# Patient Record
Sex: FEMALE | Race: WHITE | HISPANIC OR LATINO
[De-identification: names, ages, dates, MRNs, and addresses within clinical notes are randomized per-mention and may not be internally consistent; named-entity substitution may affect disease eponyms.]

---

## 2018-06-06 ENCOUNTER — APPOINTMENT (OUTPATIENT)
Dept: ANTEPARTUM | Facility: CLINIC | Age: 28
End: 2018-06-06
Payer: MEDICAID

## 2018-06-06 ENCOUNTER — APPOINTMENT (OUTPATIENT)
Dept: MATERNAL FETAL MEDICINE | Facility: CLINIC | Age: 28
End: 2018-06-06
Payer: MEDICAID

## 2018-06-06 ENCOUNTER — ASOB RESULT (OUTPATIENT)
Age: 28
End: 2018-06-06

## 2018-06-06 VITALS
RESPIRATION RATE: 18 BRPM | WEIGHT: 199 LBS | HEIGHT: 64 IN | SYSTOLIC BLOOD PRESSURE: 98 MMHG | BODY MASS INDEX: 33.97 KG/M2 | DIASTOLIC BLOOD PRESSURE: 56 MMHG | HEART RATE: 90 BPM | OXYGEN SATURATION: 98 %

## 2018-06-06 DIAGNOSIS — O99.210 OBESITY COMPLICATING PREGNANCY, UNSPECIFIED TRIMESTER: ICD-10-CM

## 2018-06-06 DIAGNOSIS — Z98.891 HISTORY OF UTERINE SCAR FROM PREVIOUS SURGERY: ICD-10-CM

## 2018-06-06 DIAGNOSIS — Z87.59 PERSONAL HISTORY OF OTHER COMPLICATIONS OF PREGNANCY, CHILDBIRTH AND THE PUERPERIUM: ICD-10-CM

## 2018-06-06 PROCEDURE — 36416 COLLJ CAPILLARY BLOOD SPEC: CPT

## 2018-06-06 PROCEDURE — 76813 OB US NUCHAL MEAS 1 GEST: CPT

## 2018-06-06 PROCEDURE — 99213 OFFICE O/P EST LOW 20 MIN: CPT

## 2018-06-06 RX ORDER — VITAMIN C, CALCIUM, IRON, VITAMIN D3, VITAMIN E, VITAMIN B1, VITAMIN B2, VITAMIN B3, VITAMIN B6, FOLIC ACID, IODINE, ZINC, COPPER, DOCUSATE SODIUM, DOCOSAHEXAENOIC ACID (DHA) 27-1-50 MG
KIT ORAL
Refills: 0 | Status: ACTIVE | COMMUNITY

## 2018-06-12 LAB
1ST TRIMESTER DATA: NORMAL
ADDENDUM DOC: NORMAL
AFP PNL SERPL: NORMAL
AFP SERPL-ACNC: NORMAL
CLINICAL BIOCHEMIST REVIEW: NORMAL
FREE BETA HCG 1ST TRIMESTER: NORMAL
Lab: NORMAL
NASAL BONE: PRESENT
NOTES NTD: NORMAL
NT: NORMAL
PAPP-A SERPL-ACNC: NORMAL
TRISOMY 18/3: NORMAL

## 2018-06-22 ENCOUNTER — ASOB RESULT (OUTPATIENT)
Age: 28
End: 2018-06-22

## 2018-06-22 ENCOUNTER — APPOINTMENT (OUTPATIENT)
Dept: MATERNAL FETAL MEDICINE | Facility: CLINIC | Age: 28
End: 2018-06-22
Payer: MEDICAID

## 2018-06-22 PROCEDURE — 76815 OB US LIMITED FETUS(S): CPT

## 2018-08-02 ENCOUNTER — APPOINTMENT (OUTPATIENT)
Dept: ANTEPARTUM | Facility: CLINIC | Age: 28
End: 2018-08-02
Payer: MEDICAID

## 2018-08-02 ENCOUNTER — ASOB RESULT (OUTPATIENT)
Age: 28
End: 2018-08-02

## 2018-08-02 PROCEDURE — 76811 OB US DETAILED SNGL FETUS: CPT

## 2018-08-02 PROCEDURE — 76817 TRANSVAGINAL US OBSTETRIC: CPT

## 2018-08-03 ENCOUNTER — APPOINTMENT (OUTPATIENT)
Dept: ANTEPARTUM | Facility: CLINIC | Age: 28
End: 2018-08-03

## 2018-08-16 ENCOUNTER — APPOINTMENT (OUTPATIENT)
Dept: ANTEPARTUM | Facility: CLINIC | Age: 28
End: 2018-08-16
Payer: MEDICAID

## 2018-08-16 ENCOUNTER — ASOB RESULT (OUTPATIENT)
Age: 28
End: 2018-08-16

## 2018-08-16 PROCEDURE — 76816 OB US FOLLOW-UP PER FETUS: CPT

## 2018-10-08 ENCOUNTER — APPOINTMENT (OUTPATIENT)
Dept: ANTEPARTUM | Facility: CLINIC | Age: 28
End: 2018-10-08
Payer: MEDICAID

## 2018-10-08 ENCOUNTER — ASOB RESULT (OUTPATIENT)
Age: 28
End: 2018-10-08

## 2018-10-08 PROCEDURE — 76816 OB US FOLLOW-UP PER FETUS: CPT

## 2018-10-08 PROCEDURE — 76819 FETAL BIOPHYS PROFIL W/O NST: CPT

## 2018-11-19 ENCOUNTER — ASOB RESULT (OUTPATIENT)
Age: 28
End: 2018-11-19

## 2018-11-19 ENCOUNTER — APPOINTMENT (OUTPATIENT)
Dept: ANTEPARTUM | Facility: CLINIC | Age: 28
End: 2018-11-19
Payer: MEDICAID

## 2018-11-19 PROCEDURE — 76819 FETAL BIOPHYS PROFIL W/O NST: CPT

## 2018-11-19 PROCEDURE — 76816 OB US FOLLOW-UP PER FETUS: CPT

## 2018-11-27 ENCOUNTER — OUTPATIENT (OUTPATIENT)
Dept: OUTPATIENT SERVICES | Facility: HOSPITAL | Age: 28
LOS: 1 days | End: 2018-11-27
Payer: COMMERCIAL

## 2018-11-27 VITALS
HEART RATE: 97 BPM | RESPIRATION RATE: 16 BRPM | DIASTOLIC BLOOD PRESSURE: 70 MMHG | TEMPERATURE: 98 F | SYSTOLIC BLOOD PRESSURE: 118 MMHG

## 2018-11-27 VITALS — HEIGHT: 65.75 IN | WEIGHT: 191.8 LBS

## 2018-11-27 DIAGNOSIS — Z01.818 ENCOUNTER FOR OTHER PREPROCEDURAL EXAMINATION: ICD-10-CM

## 2018-11-27 DIAGNOSIS — Z98.89 OTHER SPECIFIED POSTPROCEDURAL STATES: Chronic | ICD-10-CM

## 2018-11-27 LAB
APPEARANCE UR: CLEAR — SIGNIFICANT CHANGE UP
BACTERIA # UR AUTO: ABNORMAL
BASOPHILS # BLD AUTO: 0 K/UL — SIGNIFICANT CHANGE UP (ref 0–0.2)
BASOPHILS NFR BLD AUTO: 0.1 % — SIGNIFICANT CHANGE UP (ref 0–2)
BILIRUB UR-MCNC: NEGATIVE — SIGNIFICANT CHANGE UP
BLD GP AB SCN SERPL QL: SIGNIFICANT CHANGE UP
COLOR SPEC: YELLOW — SIGNIFICANT CHANGE UP
DIFF PNL FLD: ABNORMAL
EOSINOPHIL # BLD AUTO: 0.1 K/UL — SIGNIFICANT CHANGE UP (ref 0–0.5)
EOSINOPHIL NFR BLD AUTO: 1.1 % — SIGNIFICANT CHANGE UP (ref 0–6)
EPI CELLS # UR: ABNORMAL
GLUCOSE UR QL: NEGATIVE MG/DL — SIGNIFICANT CHANGE UP
HCT VFR BLD CALC: 36.1 % — LOW (ref 37–47)
HGB BLD-MCNC: 12 G/DL — SIGNIFICANT CHANGE UP (ref 12–16)
KETONES UR-MCNC: NEGATIVE — SIGNIFICANT CHANGE UP
LEUKOCYTE ESTERASE UR-ACNC: ABNORMAL
LYMPHOCYTES # BLD AUTO: 1.6 K/UL — SIGNIFICANT CHANGE UP (ref 1–4.8)
LYMPHOCYTES # BLD AUTO: 17.9 % — LOW (ref 20–55)
MCHC RBC-ENTMCNC: 28.6 PG — SIGNIFICANT CHANGE UP (ref 27–31)
MCHC RBC-ENTMCNC: 33.2 G/DL — SIGNIFICANT CHANGE UP (ref 32–36)
MCV RBC AUTO: 86.2 FL — SIGNIFICANT CHANGE UP (ref 81–99)
MONOCYTES # BLD AUTO: 0.5 K/UL — SIGNIFICANT CHANGE UP (ref 0–0.8)
MONOCYTES NFR BLD AUTO: 5.8 % — SIGNIFICANT CHANGE UP (ref 3–10)
NEUTROPHILS # BLD AUTO: 6.6 K/UL — SIGNIFICANT CHANGE UP (ref 1.8–8)
NEUTROPHILS NFR BLD AUTO: 74.6 % — HIGH (ref 37–73)
NITRITE UR-MCNC: NEGATIVE — SIGNIFICANT CHANGE UP
PH UR: 7 — SIGNIFICANT CHANGE UP (ref 5–8)
PLATELET # BLD AUTO: 176 K/UL — SIGNIFICANT CHANGE UP (ref 150–400)
PROT UR-MCNC: 15 MG/DL
RBC # BLD: 4.19 M/UL — LOW (ref 4.4–5.2)
RBC # FLD: 14.9 % — SIGNIFICANT CHANGE UP (ref 11–15.6)
RBC CASTS # UR COMP ASSIST: SIGNIFICANT CHANGE UP /HPF (ref 0–4)
SP GR SPEC: 1.01 — SIGNIFICANT CHANGE UP (ref 1.01–1.02)
TYPE + AB SCN PNL BLD: SIGNIFICANT CHANGE UP
UROBILINOGEN FLD QL: NEGATIVE MG/DL — SIGNIFICANT CHANGE UP
WBC # BLD: 8.8 K/UL — SIGNIFICANT CHANGE UP (ref 4.8–10.8)
WBC # FLD AUTO: 8.8 K/UL — SIGNIFICANT CHANGE UP (ref 4.8–10.8)
WBC UR QL: SIGNIFICANT CHANGE UP

## 2018-11-27 PROCEDURE — 36415 COLL VENOUS BLD VENIPUNCTURE: CPT

## 2018-11-27 PROCEDURE — 86901 BLOOD TYPING SEROLOGIC RH(D): CPT

## 2018-11-27 PROCEDURE — 81001 URINALYSIS AUTO W/SCOPE: CPT

## 2018-11-27 PROCEDURE — 86780 TREPONEMA PALLIDUM: CPT

## 2018-11-27 PROCEDURE — 85027 COMPLETE CBC AUTOMATED: CPT

## 2018-11-27 PROCEDURE — 86900 BLOOD TYPING SEROLOGIC ABO: CPT

## 2018-11-27 PROCEDURE — 86850 RBC ANTIBODY SCREEN: CPT

## 2018-11-28 LAB — T PALLIDUM AB TITR SER: NEGATIVE — SIGNIFICANT CHANGE UP

## 2018-12-07 ENCOUNTER — TRANSCRIPTION ENCOUNTER (OUTPATIENT)
Age: 28
End: 2018-12-07

## 2018-12-08 ENCOUNTER — RESULT REVIEW (OUTPATIENT)
Age: 28
End: 2018-12-08

## 2018-12-08 ENCOUNTER — INPATIENT (INPATIENT)
Facility: HOSPITAL | Age: 28
LOS: 2 days | Discharge: HOME | End: 2018-12-11
Attending: OBSTETRICS & GYNECOLOGY | Admitting: OBSTETRICS & GYNECOLOGY
Payer: COMMERCIAL

## 2018-12-08 VITALS
TEMPERATURE: 99 F | DIASTOLIC BLOOD PRESSURE: 68 MMHG | HEART RATE: 95 BPM | RESPIRATION RATE: 18 BRPM | SYSTOLIC BLOOD PRESSURE: 103 MMHG | OXYGEN SATURATION: 97 %

## 2018-12-08 DIAGNOSIS — Z98.89 OTHER SPECIFIED POSTPROCEDURAL STATES: Chronic | ICD-10-CM

## 2018-12-08 DIAGNOSIS — K35.80 UNSPECIFIED ACUTE APPENDICITIS: Chronic | ICD-10-CM

## 2018-12-08 DIAGNOSIS — O34.219 MATERNAL CARE FOR UNSPECIFIED TYPE SCAR FROM PREVIOUS CESAREAN DELIVERY: ICD-10-CM

## 2018-12-08 LAB
BLD GP AB SCN SERPL QL: SIGNIFICANT CHANGE UP
TYPE + AB SCN PNL BLD: SIGNIFICANT CHANGE UP

## 2018-12-08 PROCEDURE — 88302 TISSUE EXAM BY PATHOLOGIST: CPT | Mod: 26

## 2018-12-08 PROCEDURE — 88304 TISSUE EXAM BY PATHOLOGIST: CPT | Mod: 26

## 2018-12-08 RX ORDER — DIPHENHYDRAMINE HCL 50 MG
25 CAPSULE ORAL EVERY 6 HOURS
Qty: 0 | Refills: 0 | Status: DISCONTINUED | OUTPATIENT
Start: 2018-12-08 | End: 2018-12-11

## 2018-12-08 RX ORDER — ONDANSETRON 8 MG/1
4 TABLET, FILM COATED ORAL EVERY 6 HOURS
Qty: 0 | Refills: 0 | Status: DISCONTINUED | OUTPATIENT
Start: 2018-12-08 | End: 2018-12-11

## 2018-12-08 RX ORDER — FERROUS SULFATE 325(65) MG
325 TABLET ORAL DAILY
Qty: 0 | Refills: 0 | Status: DISCONTINUED | OUTPATIENT
Start: 2018-12-08 | End: 2018-12-11

## 2018-12-08 RX ORDER — TETANUS TOXOID, REDUCED DIPHTHERIA TOXOID AND ACELLULAR PERTUSSIS VACCINE, ADSORBED 5; 2.5; 8; 8; 2.5 [IU]/.5ML; [IU]/.5ML; UG/.5ML; UG/.5ML; UG/.5ML
0.5 SUSPENSION INTRAMUSCULAR ONCE
Qty: 0 | Refills: 0 | Status: DISCONTINUED | OUTPATIENT
Start: 2018-12-08 | End: 2018-12-11

## 2018-12-08 RX ORDER — SIMETHICONE 80 MG/1
80 TABLET, CHEWABLE ORAL EVERY 4 HOURS
Qty: 0 | Refills: 0 | Status: DISCONTINUED | OUTPATIENT
Start: 2018-12-08 | End: 2018-12-11

## 2018-12-08 RX ORDER — CEFAZOLIN SODIUM 1 G
2000 VIAL (EA) INJECTION ONCE
Qty: 0 | Refills: 0 | Status: COMPLETED | OUTPATIENT
Start: 2018-12-08 | End: 2018-12-08

## 2018-12-08 RX ORDER — IBUPROFEN 200 MG
600 TABLET ORAL EVERY 6 HOURS
Qty: 0 | Refills: 0 | Status: DISCONTINUED | OUTPATIENT
Start: 2018-12-08 | End: 2018-12-11

## 2018-12-08 RX ORDER — DIPHENHYDRAMINE HCL 50 MG
25 CAPSULE ORAL EVERY 4 HOURS
Qty: 0 | Refills: 0 | Status: DISCONTINUED | OUTPATIENT
Start: 2018-12-08 | End: 2018-12-11

## 2018-12-08 RX ORDER — GLYCERIN ADULT
1 SUPPOSITORY, RECTAL RECTAL AT BEDTIME
Qty: 0 | Refills: 0 | Status: DISCONTINUED | OUTPATIENT
Start: 2018-12-08 | End: 2018-12-11

## 2018-12-08 RX ORDER — OXYTOCIN 10 UNIT/ML
41.67 VIAL (ML) INJECTION
Qty: 20 | Refills: 0 | Status: DISCONTINUED | OUTPATIENT
Start: 2018-12-08 | End: 2018-12-11

## 2018-12-08 RX ORDER — OXYCODONE HYDROCHLORIDE 5 MG/1
10 TABLET ORAL
Qty: 0 | Refills: 0 | Status: DISCONTINUED | OUTPATIENT
Start: 2018-12-08 | End: 2018-12-11

## 2018-12-08 RX ORDER — OXYCODONE HYDROCHLORIDE 5 MG/1
5 TABLET ORAL
Qty: 0 | Refills: 0 | Status: DISCONTINUED | OUTPATIENT
Start: 2018-12-08 | End: 2018-12-11

## 2018-12-08 RX ORDER — SODIUM CHLORIDE 9 MG/ML
1000 INJECTION, SOLUTION INTRAVENOUS ONCE
Qty: 0 | Refills: 0 | Status: COMPLETED | OUTPATIENT
Start: 2018-12-08 | End: 2018-12-08

## 2018-12-08 RX ORDER — OXYTOCIN 10 UNIT/ML
333.33 VIAL (ML) INJECTION
Qty: 20 | Refills: 0 | Status: DISCONTINUED | OUTPATIENT
Start: 2018-12-08 | End: 2018-12-11

## 2018-12-08 RX ORDER — ACETAMINOPHEN 500 MG
1000 TABLET ORAL ONCE
Qty: 0 | Refills: 0 | Status: COMPLETED | OUTPATIENT
Start: 2018-12-08 | End: 2018-12-08

## 2018-12-08 RX ORDER — ENOXAPARIN SODIUM 100 MG/ML
40 INJECTION SUBCUTANEOUS
Qty: 0 | Refills: 0 | Status: DISCONTINUED | OUTPATIENT
Start: 2018-12-08 | End: 2018-12-11

## 2018-12-08 RX ORDER — ACETAMINOPHEN 500 MG
1000 TABLET ORAL ONCE
Qty: 0 | Refills: 0 | Status: DISCONTINUED | OUTPATIENT
Start: 2018-12-08 | End: 2018-12-11

## 2018-12-08 RX ORDER — DOCUSATE SODIUM 100 MG
100 CAPSULE ORAL
Qty: 0 | Refills: 0 | Status: DISCONTINUED | OUTPATIENT
Start: 2018-12-08 | End: 2018-12-11

## 2018-12-08 RX ORDER — HYDROMORPHONE HYDROCHLORIDE 2 MG/ML
1 INJECTION INTRAMUSCULAR; INTRAVENOUS; SUBCUTANEOUS
Qty: 0 | Refills: 0 | Status: DISCONTINUED | OUTPATIENT
Start: 2018-12-08 | End: 2018-12-11

## 2018-12-08 RX ORDER — OXYCODONE AND ACETAMINOPHEN 5; 325 MG/1; MG/1
2 TABLET ORAL EVERY 6 HOURS
Qty: 0 | Refills: 0 | Status: DISCONTINUED | OUTPATIENT
Start: 2018-12-08 | End: 2018-12-11

## 2018-12-08 RX ORDER — SODIUM CHLORIDE 9 MG/ML
1000 INJECTION, SOLUTION INTRAVENOUS
Qty: 0 | Refills: 0 | Status: DISCONTINUED | OUTPATIENT
Start: 2018-12-08 | End: 2018-12-11

## 2018-12-08 RX ORDER — KETOROLAC TROMETHAMINE 30 MG/ML
30 SYRINGE (ML) INJECTION ONCE
Qty: 0 | Refills: 0 | Status: DISCONTINUED | OUTPATIENT
Start: 2018-12-08 | End: 2018-12-08

## 2018-12-08 RX ORDER — OXYCODONE AND ACETAMINOPHEN 5; 325 MG/1; MG/1
1 TABLET ORAL
Qty: 0 | Refills: 0 | Status: DISCONTINUED | OUTPATIENT
Start: 2018-12-08 | End: 2018-12-11

## 2018-12-08 RX ORDER — KETOROLAC TROMETHAMINE 30 MG/ML
30 SYRINGE (ML) INJECTION EVERY 6 HOURS
Qty: 0 | Refills: 0 | Status: DISCONTINUED | OUTPATIENT
Start: 2018-12-08 | End: 2018-12-09

## 2018-12-08 RX ORDER — LANOLIN
1 OINTMENT (GRAM) TOPICAL
Qty: 0 | Refills: 0 | Status: DISCONTINUED | OUTPATIENT
Start: 2018-12-08 | End: 2018-12-11

## 2018-12-08 RX ORDER — OXYTOCIN 10 UNIT/ML
41.67 VIAL (ML) INJECTION
Qty: 20 | Refills: 0 | Status: DISCONTINUED | OUTPATIENT
Start: 2018-12-08 | End: 2018-12-08

## 2018-12-08 RX ORDER — CITRIC ACID/SODIUM CITRATE 300-500 MG
30 SOLUTION, ORAL ORAL ONCE
Qty: 0 | Refills: 0 | Status: COMPLETED | OUTPATIENT
Start: 2018-12-08 | End: 2018-12-08

## 2018-12-08 RX ORDER — NALOXONE HYDROCHLORIDE 4 MG/.1ML
0.1 SPRAY NASAL
Qty: 0 | Refills: 0 | Status: DISCONTINUED | OUTPATIENT
Start: 2018-12-08 | End: 2018-12-11

## 2018-12-08 RX ORDER — METOCLOPRAMIDE HCL 10 MG
10 TABLET ORAL ONCE
Qty: 0 | Refills: 0 | Status: DISCONTINUED | OUTPATIENT
Start: 2018-12-08 | End: 2018-12-11

## 2018-12-08 RX ADMIN — SODIUM CHLORIDE 125 MILLILITER(S): 9 INJECTION, SOLUTION INTRAVENOUS at 06:59

## 2018-12-08 RX ADMIN — Medication 1000 MILLIUNIT(S)/MIN: at 08:26

## 2018-12-08 RX ADMIN — Medication 1000 MILLIGRAM(S): at 11:49

## 2018-12-08 RX ADMIN — SODIUM CHLORIDE 2000 MILLILITER(S): 9 INJECTION, SOLUTION INTRAVENOUS at 06:20

## 2018-12-08 RX ADMIN — Medication 30 MILLILITER(S): at 07:13

## 2018-12-08 RX ADMIN — Medication 30 MILLIGRAM(S): at 20:42

## 2018-12-08 RX ADMIN — ENOXAPARIN SODIUM 40 MILLIGRAM(S): 100 INJECTION SUBCUTANEOUS at 20:27

## 2018-12-08 RX ADMIN — Medication 400 MILLIGRAM(S): at 10:08

## 2018-12-08 RX ADMIN — Medication 30 MILLIGRAM(S): at 14:00

## 2018-12-08 RX ADMIN — Medication 125 MILLIUNIT(S)/MIN: at 09:58

## 2018-12-08 RX ADMIN — Medication 100 MILLIGRAM(S): at 08:00

## 2018-12-08 RX ADMIN — Medication 30 MILLIGRAM(S): at 20:27

## 2018-12-08 RX ADMIN — Medication 30 MILLIGRAM(S): at 14:15

## 2018-12-08 NOTE — OB NEONATOLOGY/PEDIATRICIAN DELIVERY SUMMARY - NSPEDSNEONOTESA_OBGYN_ALL_OB_FT
Requested by Dr. Wagner to attempt this repeat C/S. Mother is O positive, Rubella Imm, GBS negative, RPR neg, HIV neg and HBsAg negative. Infant born on  presentation , good tone and cry. Apgar 9 and 9. Full term male AGA. Requested by Dr. Wagner to attempt this repeat C/S. Mother is O positive, Rubella Imm, GBS negative, RPR neg, HIV neg and HBsAg negative. Infant born on vertex  presentation , good tone and cry. Apgar 9 and 9. Full term male AGA.BW 3830g.

## 2018-12-08 NOTE — OB PROVIDER H&P - NSHPPHYSICALEXAM_GEN_ALL_CORE
Vital Signs Last 24 Hrs  T(C): 36.9 (08 Dec 2018 06:29), Max: 36.9 (08 Dec 2018 06:11)  T(F): 98.42 (08 Dec 2018 06:29), Max: 98.42 (08 Dec 2018 06:29)  HR: 96 (08 Dec 2018 06:29) (96 - 96)  BP: 111/67 (08 Dec 2018 06:29) (111/67 - 111/67)  RR: 16 (08 Dec 2018 06:29) (16 - 16)    FHT: baseline 130, moderate variability, +accels, no decels   [place]: no contractions  SVE: deferred

## 2018-12-08 NOTE — OB PROVIDER H&P - HISTORY OF PRESENT ILLNESS
29 y/o  at 39w2d presenting for scheduled repeat  section.   Prenatal course uncomplicated.     ObHx: C/S x2, SAB x3  GynHx: none  PMH/PSH: appendectomy in   Meds: PNV   Allergies: NKDA

## 2018-12-09 LAB
BASOPHILS # BLD AUTO: 0 K/UL — SIGNIFICANT CHANGE UP (ref 0–0.2)
BASOPHILS NFR BLD AUTO: 0.1 % — SIGNIFICANT CHANGE UP (ref 0–2)
EOSINOPHIL # BLD AUTO: 0.2 K/UL — SIGNIFICANT CHANGE UP (ref 0–0.5)
EOSINOPHIL NFR BLD AUTO: 1.7 % — SIGNIFICANT CHANGE UP (ref 0–6)
HCT VFR BLD CALC: 30.6 % — LOW (ref 37–47)
HGB BLD-MCNC: 9.9 G/DL — LOW (ref 12–16)
LYMPHOCYTES # BLD AUTO: 1.8 K/UL — SIGNIFICANT CHANGE UP (ref 1–4.8)
LYMPHOCYTES # BLD AUTO: 18.9 % — LOW (ref 20–55)
MCHC RBC-ENTMCNC: 27.8 PG — SIGNIFICANT CHANGE UP (ref 27–31)
MCHC RBC-ENTMCNC: 32.4 G/DL — SIGNIFICANT CHANGE UP (ref 32–36)
MCV RBC AUTO: 86 FL — SIGNIFICANT CHANGE UP (ref 81–99)
MONOCYTES # BLD AUTO: 0.5 K/UL — SIGNIFICANT CHANGE UP (ref 0–0.8)
MONOCYTES NFR BLD AUTO: 5.5 % — SIGNIFICANT CHANGE UP (ref 3–10)
NEUTROPHILS # BLD AUTO: 6.8 K/UL — SIGNIFICANT CHANGE UP (ref 1.8–8)
NEUTROPHILS NFR BLD AUTO: 73.6 % — HIGH (ref 37–73)
PLATELET # BLD AUTO: 151 K/UL — SIGNIFICANT CHANGE UP (ref 150–400)
RBC # BLD: 3.56 M/UL — LOW (ref 4.4–5.2)
RBC # FLD: 15 % — SIGNIFICANT CHANGE UP (ref 11–15.6)
T PALLIDUM AB TITR SER: NEGATIVE — SIGNIFICANT CHANGE UP
WBC # BLD: 9.3 K/UL — SIGNIFICANT CHANGE UP (ref 4.8–10.8)
WBC # FLD AUTO: 9.3 K/UL — SIGNIFICANT CHANGE UP (ref 4.8–10.8)

## 2018-12-09 RX ADMIN — Medication 600 MILLIGRAM(S): at 17:43

## 2018-12-09 RX ADMIN — Medication 325 MILLIGRAM(S): at 12:22

## 2018-12-09 RX ADMIN — Medication 600 MILLIGRAM(S): at 18:30

## 2018-12-09 RX ADMIN — ENOXAPARIN SODIUM 40 MILLIGRAM(S): 100 INJECTION SUBCUTANEOUS at 20:09

## 2018-12-09 RX ADMIN — OXYCODONE AND ACETAMINOPHEN 1 TABLET(S): 5; 325 TABLET ORAL at 21:58

## 2018-12-09 RX ADMIN — SIMETHICONE 80 MILLIGRAM(S): 80 TABLET, CHEWABLE ORAL at 08:21

## 2018-12-09 RX ADMIN — Medication 30 MILLIGRAM(S): at 02:11

## 2018-12-09 RX ADMIN — Medication 600 MILLIGRAM(S): at 08:20

## 2018-12-09 RX ADMIN — OXYCODONE AND ACETAMINOPHEN 1 TABLET(S): 5; 325 TABLET ORAL at 16:39

## 2018-12-09 RX ADMIN — Medication 100 MILLIGRAM(S): at 12:22

## 2018-12-09 RX ADMIN — Medication 600 MILLIGRAM(S): at 09:00

## 2018-12-09 RX ADMIN — OXYCODONE AND ACETAMINOPHEN 1 TABLET(S): 5; 325 TABLET ORAL at 13:00

## 2018-12-09 RX ADMIN — SIMETHICONE 80 MILLIGRAM(S): 80 TABLET, CHEWABLE ORAL at 12:22

## 2018-12-09 RX ADMIN — OXYCODONE AND ACETAMINOPHEN 1 TABLET(S): 5; 325 TABLET ORAL at 12:22

## 2018-12-09 RX ADMIN — OXYCODONE AND ACETAMINOPHEN 1 TABLET(S): 5; 325 TABLET ORAL at 23:09

## 2018-12-09 RX ADMIN — OXYCODONE AND ACETAMINOPHEN 1 TABLET(S): 5; 325 TABLET ORAL at 17:37

## 2018-12-09 RX ADMIN — Medication 600 MILLIGRAM(S): at 23:55

## 2018-12-09 RX ADMIN — Medication 1 TABLET(S): at 12:22

## 2018-12-09 RX ADMIN — Medication 30 MILLIGRAM(S): at 02:26

## 2018-12-09 NOTE — PROGRESS NOTE ADULT - SUBJECTIVE AND OBJECTIVE BOX
INTERVAL HPI/OVERNIGHT EVENTS:  28y Female s/p repeat c section and B/L tubal ligation under spinal anesthesia with duramorph for post op analgesia on 10/8/18    Vital Signs Last 24 Hrs  T(C): 36.7 (09 Dec 2018 08:12), Max: 36.7 (08 Dec 2018 16:06)  T(F): 98.1 (09 Dec 2018 08:12), Max: 98.1 (09 Dec 2018 08:12)  HR: 102 (09 Dec 2018 08:12) (77 - 102)  BP: 106/56 (09 Dec 2018 08:12) (104/63 - 124/75)  BP(mean): --  RR: 18 (09 Dec 2018 08:12) (16 - 21)  SpO2: 97% (09 Dec 2018 04:13) (97% - 97%)    Patient seen, doing well, no anesthetic complications or complaints noted or reported.  Pain is controlled.

## 2018-12-09 NOTE — PROGRESS NOTE ADULT - ASSESSMENT
Section Day: 1  She feels well  Continue the current pain medication  Encourage Ambulation  Encourage regular diet   DVT ppx: SCDs  She is stable, tolerates a diet and has normal flatus but no BM yet  She will be discharged on Day 2,3 or 4   according to the normal criteria and her wishes

## 2018-12-09 NOTE — PROGRESS NOTE ADULT - SUBJECTIVE AND OBJECTIVE BOX
Postpartum Note,  Section  She is a  28y woman who is now post-operative day 1    Subjective:  The patient feels well.  She has ambulated and is tolerating a diet  She is having  flatus, but no BM yet  She reports no breathing problems  She reports no headache or visual changes  She reports normal postpartum bleeding    Physical exam:  She generally looks and feels well    Vital Signs Last 24 Hrs  T(C): 36.7 (09 Dec 2018 08:12), Max: 36.7 (08 Dec 2018 16:06)  T(F): 98.1 (09 Dec 2018 08:12), Max: 98.1 (09 Dec 2018 08:12)  HR: 102 (09 Dec 2018 08:12) (79 - 102)  BP: 106/56 (09 Dec 2018 08:12) (106/56 - 119/79)  BP(mean): --  RR: 18 (09 Dec 2018 08:12) (17 - 21)  SpO2: 97% (09 Dec 2018 04:13) (97% - 97%)    Lungs: Normal  Heart: Regular rate and rhythm  Abdomen: Soft, nontender, no distension , firm uterine fundus, the incision is clean dry and intact  Pelvic: Normal lochia noted  Ext: No DVT signs, warm extremities, normal pulses    LABS:                        9.9    9.3   )-----------( 151      ( 09 Dec 2018 07:01 )             30.6                 Allergies    No Known Allergies    Intolerances      MEDICATIONS  (STANDING):  acetaminophen  IVPB .. 1000 milliGRAM(s) IV Intermittent once  diphtheria/tetanus/pertussis (acellular) Vaccine (ADAcel) 0.5 milliLiter(s) IntraMuscular once  enoxaparin Injectable 40 milliGRAM(s) SubCutaneous <User Schedule>  ferrous    sulfate 325 milliGRAM(s) Oral daily  lactated ringers. 1000 milliLiter(s) (125 mL/Hr) IV Continuous <Continuous>  lactated ringers. 1000 milliLiter(s) (125 mL/Hr) IV Continuous <Continuous>  lactated ringers. 1000 milliLiter(s) (125 mL/Hr) IV Continuous <Continuous>  oxytocin Infusion 333.333 milliUNIT(s)/Min (1000 mL/Hr) IV Continuous <Continuous>  oxytocin Infusion 41.667 milliUNIT(s)/Min (125 mL/Hr) IV Continuous <Continuous>  prenatal multivitamin 1 Tablet(s) Oral daily    MEDICATIONS  (PRN):  diphenhydrAMINE 25 milliGRAM(s) Oral every 6 hours PRN Itching  diphenhydrAMINE   Injectable 25 milliGRAM(s) IV Push every 4 hours PRN Pruritus  docusate sodium 100 milliGRAM(s) Oral two times a day PRN Stool Softening  glycerin Suppository - Adult 1 Suppository(s) Rectal at bedtime PRN Constipation  HYDROmorphone  Injectable 1 milliGRAM(s) IV Push every 3 hours PRN Severe Pain (7 - 10)  ibuprofen  Tablet. 600 milliGRAM(s) Oral every 6 hours PRN Mild Pain (1 - 3)  lanolin Ointment 1 Application(s) Topical every 3 hours PRN Sore Nipples  metoclopramide Injectable 10 milliGRAM(s) IV Push once PRN Nausea and/or Vomiting  naloxone Injectable 0.1 milliGRAM(s) IV Push every 3 minutes PRN For ANY of the following changes in patient status:  A. RR LESS THAN 10 breaths per minute, B. Oxygen saturation LESS THAN 90%, C. Sedation score of 6  ondansetron Injectable 4 milliGRAM(s) IV Push every 6 hours PRN Nausea  ondansetron Injectable 4 milliGRAM(s) IV Push every 6 hours PRN Postoperative Nausea and  Vomiting  oxyCODONE    5 mG/acetaminophen 325 mG 1 Tablet(s) Oral every 3 hours PRN Moderate Pain (4 - 6)  oxyCODONE    5 mG/acetaminophen 325 mG 2 Tablet(s) Oral every 6 hours PRN Severe Pain (7 - 10)  oxyCODONE    IR 5 milliGRAM(s) Oral every 3 hours PRN Mild Pain (1 - 3)  oxyCODONE    IR 10 milliGRAM(s) Oral every 3 hours PRN Moderate Pain (4 - 6)  simethicone 80 milliGRAM(s) Chew every 4 hours PRN Gas

## 2018-12-10 RX ADMIN — Medication 600 MILLIGRAM(S): at 11:22

## 2018-12-10 RX ADMIN — Medication 600 MILLIGRAM(S): at 00:52

## 2018-12-10 RX ADMIN — OXYCODONE AND ACETAMINOPHEN 1 TABLET(S): 5; 325 TABLET ORAL at 06:37

## 2018-12-10 RX ADMIN — Medication 325 MILLIGRAM(S): at 11:22

## 2018-12-10 RX ADMIN — Medication 600 MILLIGRAM(S): at 21:41

## 2018-12-10 RX ADMIN — ENOXAPARIN SODIUM 40 MILLIGRAM(S): 100 INJECTION SUBCUTANEOUS at 20:11

## 2018-12-10 RX ADMIN — Medication 600 MILLIGRAM(S): at 20:41

## 2018-12-10 RX ADMIN — OXYCODONE AND ACETAMINOPHEN 2 TABLET(S): 5; 325 TABLET ORAL at 15:12

## 2018-12-10 RX ADMIN — SIMETHICONE 80 MILLIGRAM(S): 80 TABLET, CHEWABLE ORAL at 11:22

## 2018-12-10 RX ADMIN — Medication 600 MILLIGRAM(S): at 11:52

## 2018-12-10 RX ADMIN — Medication 1 TABLET(S): at 11:22

## 2018-12-10 NOTE — PROGRESS NOTE ADULT - SUBJECTIVE AND OBJECTIVE BOX
Postpartum Note,  Section  She is a  28y woman who is now post-operative day 2    Subjective:  The patient feels well.  She is ambulating and tolerating a diet  She is having bowel movements and flatus  She reports no breathing problems  She reports no headache or visual changes  She reports normal postpartum bleeding    Physical exam:  She generally looks and feels well    Vital Signs Last 24 Hrs  T(C): 36.7 (10 Dec 2018 08:26), Max: 36.8 (09 Dec 2018 20:35)  T(F): 98 (10 Dec 2018 08:26), Max: 98.2 (09 Dec 2018 20:35)  HR: 77 (10 Dec 2018 08:26) (77 - 93)  BP: 105/70 (10 Dec 2018 08:26) (105/70 - 115/76)  BP(mean): --  RR: 18 (10 Dec 2018 08:26) (18 - 20)  SpO2: 98% (09 Dec 2018 20:35) (98% - 98%)    Lungs: Normal  Heart: Regular rate and rhythm  Abdomen: Soft, nontender, no distension , firm uterine fundus, the incision is clean dry and intact  Pelvic: Normal lochia noted  Ext: No DVT signs, warm extremities, normal pulses    LABS:                        9.9    9.3   )-----------( 151      ( 09 Dec 2018 07:01 )             30.6                 Allergies    No Known Allergies    Intolerances      MEDICATIONS  (STANDING):  acetaminophen  IVPB .. 1000 milliGRAM(s) IV Intermittent once  diphtheria/tetanus/pertussis (acellular) Vaccine (ADAcel) 0.5 milliLiter(s) IntraMuscular once  enoxaparin Injectable 40 milliGRAM(s) SubCutaneous <User Schedule>  ferrous    sulfate 325 milliGRAM(s) Oral daily  lactated ringers. 1000 milliLiter(s) (125 mL/Hr) IV Continuous <Continuous>  lactated ringers. 1000 milliLiter(s) (125 mL/Hr) IV Continuous <Continuous>  lactated ringers. 1000 milliLiter(s) (125 mL/Hr) IV Continuous <Continuous>  oxytocin Infusion 333.333 milliUNIT(s)/Min (1000 mL/Hr) IV Continuous <Continuous>  oxytocin Infusion 41.667 milliUNIT(s)/Min (125 mL/Hr) IV Continuous <Continuous>  prenatal multivitamin 1 Tablet(s) Oral daily    MEDICATIONS  (PRN):  diphenhydrAMINE 25 milliGRAM(s) Oral every 6 hours PRN Itching  diphenhydrAMINE   Injectable 25 milliGRAM(s) IV Push every 4 hours PRN Pruritus  docusate sodium 100 milliGRAM(s) Oral two times a day PRN Stool Softening  glycerin Suppository - Adult 1 Suppository(s) Rectal at bedtime PRN Constipation  HYDROmorphone  Injectable 1 milliGRAM(s) IV Push every 3 hours PRN Severe Pain (7 - 10)  ibuprofen  Tablet. 600 milliGRAM(s) Oral every 6 hours PRN Mild Pain (1 - 3)  lanolin Ointment 1 Application(s) Topical every 3 hours PRN Sore Nipples  metoclopramide Injectable 10 milliGRAM(s) IV Push once PRN Nausea and/or Vomiting  naloxone Injectable 0.1 milliGRAM(s) IV Push every 3 minutes PRN For ANY of the following changes in patient status:  A. RR LESS THAN 10 breaths per minute, B. Oxygen saturation LESS THAN 90%, C. Sedation score of 6  ondansetron Injectable 4 milliGRAM(s) IV Push every 6 hours PRN Nausea  ondansetron Injectable 4 milliGRAM(s) IV Push every 6 hours PRN Postoperative Nausea and  Vomiting  oxyCODONE    5 mG/acetaminophen 325 mG 1 Tablet(s) Oral every 3 hours PRN Moderate Pain (4 - 6)  oxyCODONE    5 mG/acetaminophen 325 mG 2 Tablet(s) Oral every 6 hours PRN Severe Pain (7 - 10)  oxyCODONE    IR 5 milliGRAM(s) Oral every 3 hours PRN Mild Pain (1 - 3)  oxyCODONE    IR 10 milliGRAM(s) Oral every 3 hours PRN Moderate Pain (4 - 6)  simethicone 80 milliGRAM(s) Chew every 4 hours PRN Gas

## 2018-12-10 NOTE — PROGRESS NOTE ADULT - ASSESSMENT
Section Day: 2  She feels well  Continue the current pain medication  Encourage Ambulation  Encourage regular diet   DVT ppx: SCDs  She is stable, tolerates a diet and has normal flatus and bowel movements  She will be discharged on Day 3 or 4   according to the normal criteria and as per her wishes

## 2018-12-11 ENCOUNTER — TRANSCRIPTION ENCOUNTER (OUTPATIENT)
Age: 28
End: 2018-12-11

## 2018-12-11 VITALS
TEMPERATURE: 98 F | DIASTOLIC BLOOD PRESSURE: 83 MMHG | RESPIRATION RATE: 18 BRPM | HEART RATE: 81 BPM | SYSTOLIC BLOOD PRESSURE: 120 MMHG

## 2018-12-11 RX ORDER — IBUPROFEN 200 MG
1 TABLET ORAL
Qty: 28 | Refills: 1 | OUTPATIENT
Start: 2018-12-11 | End: 2018-12-24

## 2018-12-11 RX ADMIN — Medication 100 MILLIGRAM(S): at 00:15

## 2018-12-11 RX ADMIN — SIMETHICONE 80 MILLIGRAM(S): 80 TABLET, CHEWABLE ORAL at 00:15

## 2018-12-11 RX ADMIN — Medication 600 MILLIGRAM(S): at 07:44

## 2018-12-11 RX ADMIN — Medication 600 MILLIGRAM(S): at 06:34

## 2018-12-11 NOTE — DISCHARGE NOTE OB - CARE PROVIDER_API CALL
Adwoa Olivares), Mina and Sofia [facility] Obstetrics and Gynecology  [address]  [address]  [place] [address]  Phone: [phone]  Fax: [phone]

## 2018-12-11 NOTE — DISCHARGE NOTE OB - BECAUSE OF A PHYSICAL, MENTAL OR EMOTIONAL CONDITION, DO YOU HAVE DIFFICULTY DOING  ERRANDS ALONE LIKE VISITING A DOCTOR'S OFFICE OR SHOPPING (15 YEARS AND OLDER)
Patient medically cleared by MDs for discharge. Vitals stable. IV removed. Tele removed and returned. Pts belongings and Medication 7.5/325 Hydrocodone/ acetamin 1/2 tablet from Pharmacy returned. Report given to the Receiving [address] [place] [address] from Francesca Barbosa.
No

## 2018-12-11 NOTE — PROGRESS NOTE ADULT - SUBJECTIVE AND OBJECTIVE BOX
Postpartum Note,  Section  She is a  28y woman who is now post-operative day 3    Subjective:  The patient feels well.  She is ambulating and tolerating a diet  She is having bowel movements and flatus  She reports no breathing problems  She reports no headache or visual changes  She reports normal postpartum bleeding    Physical exam:  She generally looks and feels well    Vital Signs Last 24 Hrs  T(C): 37 (10 Dec 2018 19:11), Max: 37 (10 Dec 2018 19:11)  T(F): 98.6 (10 Dec 2018 19:11), Max: 98.6 (10 Dec 2018 19:11)  HR: 87 (10 Dec 2018 19:11) (77 - 87)  BP: 103/74 (10 Dec 2018 19:11) (103/74 - 105/70)  BP(mean): --  RR: 18 (10 Dec 2018 19:11) (18 - 18)  SpO2: --    Lungs: Normal  Heart: Regular rate and rhythm  Abdomen: Soft, nontender, no distension , firm uterine fundus, the incision is clean dry and intact  Pelvic: Normal lochia noted  Ext: No DVT signs, warm extremities, normal pulses    LABS:                        9.9    9.3   )-----------( 151      ( 09 Dec 2018 07:01 )             30.6                 Allergies    No Known Allergies    Intolerances      MEDICATIONS  (STANDING):  acetaminophen  IVPB .. 1000 milliGRAM(s) IV Intermittent once  diphtheria/tetanus/pertussis (acellular) Vaccine (ADAcel) 0.5 milliLiter(s) IntraMuscular once  enoxaparin Injectable 40 milliGRAM(s) SubCutaneous <User Schedule>  ferrous    sulfate 325 milliGRAM(s) Oral daily  lactated ringers. 1000 milliLiter(s) (125 mL/Hr) IV Continuous <Continuous>  lactated ringers. 1000 milliLiter(s) (125 mL/Hr) IV Continuous <Continuous>  lactated ringers. 1000 milliLiter(s) (125 mL/Hr) IV Continuous <Continuous>  oxytocin Infusion 333.333 milliUNIT(s)/Min (1000 mL/Hr) IV Continuous <Continuous>  oxytocin Infusion 41.667 milliUNIT(s)/Min (125 mL/Hr) IV Continuous <Continuous>  prenatal multivitamin 1 Tablet(s) Oral daily    MEDICATIONS  (PRN):  diphenhydrAMINE 25 milliGRAM(s) Oral every 6 hours PRN Itching  diphenhydrAMINE   Injectable 25 milliGRAM(s) IV Push every 4 hours PRN Pruritus  docusate sodium 100 milliGRAM(s) Oral two times a day PRN Stool Softening  glycerin Suppository - Adult 1 Suppository(s) Rectal at bedtime PRN Constipation  HYDROmorphone  Injectable 1 milliGRAM(s) IV Push every 3 hours PRN Severe Pain (7 - 10)  ibuprofen  Tablet. 600 milliGRAM(s) Oral every 6 hours PRN Mild Pain (1 - 3)  lanolin Ointment 1 Application(s) Topical every 3 hours PRN Sore Nipples  metoclopramide Injectable 10 milliGRAM(s) IV Push once PRN Nausea and/or Vomiting  naloxone Injectable 0.1 milliGRAM(s) IV Push every 3 minutes PRN For ANY of the following changes in patient status:  A. RR LESS THAN 10 breaths per minute, B. Oxygen saturation LESS THAN 90%, C. Sedation score of 6  ondansetron Injectable 4 milliGRAM(s) IV Push every 6 hours PRN Nausea  ondansetron Injectable 4 milliGRAM(s) IV Push every 6 hours PRN Postoperative Nausea and  Vomiting  oxyCODONE    5 mG/acetaminophen 325 mG 1 Tablet(s) Oral every 3 hours PRN Moderate Pain (4 - 6)  oxyCODONE    5 mG/acetaminophen 325 mG 2 Tablet(s) Oral every 6 hours PRN Severe Pain (7 - 10)  oxyCODONE    IR 5 milliGRAM(s) Oral every 3 hours PRN Mild Pain (1 - 3)  oxyCODONE    IR 10 milliGRAM(s) Oral every 3 hours PRN Moderate Pain (4 - 6)  simethicone 80 milliGRAM(s) Chew every 4 hours PRN Gas

## 2018-12-11 NOTE — PROGRESS NOTE ADULT - ASSESSMENT
Section Day:   She feels well  Continue the current pain medication  Encourage Ambulation  Encourage regular diet   DVT ppx: SCDs  She is stable, tolerates a diet and has normal flatus and bowel movements    She will be discharged today according to the normal criteria and her wishes    She will follow-up in our office in one week for wound mgmt

## 2018-12-11 NOTE — DISCHARGE NOTE OB - MEDICATION SUMMARY - MEDICATIONS TO STOP TAKING
I will STOP taking the medications listed below when I get home from the hospital:    Macrodantin 100 mg oral capsule  -- 1 cap(s) by mouth 2 times a day  -- Finish all this medication unless otherwise directed by prescriber.  May discolor urine or feces.  Take with food or milk.

## 2018-12-11 NOTE — DISCHARGE NOTE OB - PATIENT PORTAL LINK FT
You can access the [facility], offered by [facility], by registering with the following website: [email or web address]

## 2018-12-11 NOTE — DISCHARGE NOTE OB - CARE PLAN
Principal Discharge DX:	 delivery delivered  Goal:	pain free  Assessment and plan of treatment:	Please follow up in our office in one week.  Please call sooner if there are any additional concerns.

## 2018-12-11 NOTE — DISCHARGE NOTE OB - PLAN OF CARE
pain free Please follow up in our office in one week.  Please call sooner if there are any additional concerns.

## 2018-12-11 NOTE — DISCHARGE NOTE OB - MEDICATION SUMMARY - MEDICATIONS TO TAKE
I will START or STAY ON the medications listed below when I get home from the hospital:    ibuprofen 600 mg oral tablet  -- 1 tab(s) by mouth every 6 hours, As needed, Mild Pain (1 - 3)  -- Indication: For pain    Prenatal Multivitamins with Folic Acid 1 mg oral tablet  -- 1 tab(s) by mouth once a day  -- Indication: For prior med

## 2018-12-12 LAB — SURGICAL PATHOLOGY FINAL REPORT - CH: SIGNIFICANT CHANGE UP

## 2019-01-09 PROCEDURE — 86850 RBC ANTIBODY SCREEN: CPT

## 2019-01-09 PROCEDURE — 85027 COMPLETE CBC AUTOMATED: CPT

## 2019-01-09 PROCEDURE — 86780 TREPONEMA PALLIDUM: CPT

## 2019-01-09 PROCEDURE — 88304 TISSUE EXAM BY PATHOLOGIST: CPT

## 2019-01-09 PROCEDURE — 36415 COLL VENOUS BLD VENIPUNCTURE: CPT

## 2019-01-09 PROCEDURE — 88302 TISSUE EXAM BY PATHOLOGIST: CPT

## 2019-01-09 PROCEDURE — 86900 BLOOD TYPING SEROLOGIC ABO: CPT

## 2019-01-09 PROCEDURE — T1013: CPT

## 2019-01-09 PROCEDURE — 86901 BLOOD TYPING SEROLOGIC RH(D): CPT

## 2022-07-19 NOTE — OB RN DELIVERY SUMMARY - NS_RNDELIVATTEST_OBGYN_ALL_OB
reviewing chart and coordinating care with primary team/staff, as well as reviewing vitals, radiology, medication list, recent labs, and prior records. Laps, needles and instrument count was correct

## 2024-05-23 NOTE — OB RN INTRAOPERATIVE NOTE - NS_SURGICALCHECK_OBGYN_ALL_OB
[FreeTextEntry1] : 3/13/24 GL;UCOSE 91, , AST 44, ALT 69, CHOL 201, TRIG 50, HDL 63, , HGBA1C 5.2, TSH 1.230 12/14/24 [facility] GLUCOSE 117, PRP NEGATIVE,HIV NEGATIVE Yes